# Patient Record
Sex: MALE | Race: WHITE
[De-identification: names, ages, dates, MRNs, and addresses within clinical notes are randomized per-mention and may not be internally consistent; named-entity substitution may affect disease eponyms.]

---

## 2017-02-13 ENCOUNTER — HOSPITAL ENCOUNTER (OUTPATIENT)
Dept: HOSPITAL 39 - GMAJ | Age: 72
Discharge: HOME | End: 2017-02-13
Attending: FAMILY MEDICINE
Payer: MEDICARE

## 2017-02-13 DIAGNOSIS — Z79.899: Primary | ICD-10-CM

## 2017-02-13 DIAGNOSIS — M10.9: ICD-10-CM

## 2017-05-11 ENCOUNTER — HOSPITAL ENCOUNTER (OUTPATIENT)
Dept: HOSPITAL 39 - MRI | Age: 72
Discharge: HOME | End: 2017-05-11
Attending: NURSE PRACTITIONER
Payer: MEDICARE

## 2017-05-11 DIAGNOSIS — M79.601: Primary | ICD-10-CM

## 2017-05-13 NOTE — MRI
Study: MRI of the Right Shoulder. 



Indication: PAIN IN RIGHT ARM



Technique: Multiplanar, multi sequence MRI of the right shoulder

was obtained without intravenous contrast. 



Comparison: None



Findings:



External skin marker placed at the posterior margin of the

shoulder. At this site there is an ovoid 6.6 cm intramuscular

lipoma within the lateral deltoid musculature. No aggressive

features identified.



Moderate to severe AC joint osteoarthritis. Mild type II acromion

with mild lateral downsloping.



High grade supraspinatus and infraspinatus tendinosis with

ill-defined high grade interstitial tearing suspected at the

critical zone of the conjoined tendon which is filled with

granulation tissue. Areas of bursal and articular surface

extension likely present. The involved area measures 11 mm

transverse by 18 mm AP. Less pronounced similar appearing changes

noted throughout the remainder of both tendons. No full-thickness

tear or tendon retraction.



Subscapularis tendinosis with high-grade articular tearing

superior two thirds tendon insertion. Teres minor tendon intact.

Rotator cuff musculature normal without atrophy, fatty

infiltration, or intramuscular edema. Intracapsular long head

biceps tendinosis.



Circumferential labral tearing and truncation. Mild glenohumeral

joint osteoarthritis. No acute fracture.



Impression:



High-grade supraspinatus and infraspinatus tendinosis with

ill-defined interstitial tearing throughout both tendons. Changes

most pronounced at the critical zone conjoined tendon with

high-grade changes as above.



Subscapularis tendinosis with high-grade articular tearing

superior two thirds.



Intracapsular long head biceps tendinosis.



Circumferential labral tearing and truncation. 



Mild glenohumeral joint osteoarthritis.



Moderate to severe AC joint osteoarthritis.



6.6 cm intramuscular lipoma lateral deltoid musculature at the

palpable area of concern.



Electronically signed by:  Paresh Davalos MD  5/13/2017 11:54 AM

CDT

## 2018-01-25 ENCOUNTER — HOSPITAL ENCOUNTER (OUTPATIENT)
Dept: HOSPITAL 39 - RAD | Age: 73
End: 2018-01-25
Attending: ORTHOPAEDIC SURGERY
Payer: MEDICARE

## 2018-01-25 DIAGNOSIS — M19.011: ICD-10-CM

## 2018-01-25 DIAGNOSIS — M75.31: Primary | ICD-10-CM

## 2018-01-29 NOTE — RAD
EXAM DESCRIPTION: 



Shoulder,Right 2 or More Views



CLINICAL HISTORY: 



PAIN IN RIGHT SHOULDER



COMPARISON: 



None



FINDINGS: 



4 views of the right shoulder.

No acute fracture, dislocation or aggressive bone lesion is

present.

Bone mineralization appears normal.

No erosions are present.

Moderate acromioclavicular joint osteoarthritis is present.

Calcific density seen within the location of the expected

supraspinatus and infraspinatus indicating calcific tendinosis.







IMPRESSION: 



Rotator cuff calcific tendinosis.

Moderate AC joint arthrosis.



Electronically signed by:  Dhaval Marks MD  1/29/2018 7:32 AM

[facility] Workstation: [id number]